# Patient Record
Sex: FEMALE
[De-identification: names, ages, dates, MRNs, and addresses within clinical notes are randomized per-mention and may not be internally consistent; named-entity substitution may affect disease eponyms.]

---
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
## 2023-09-11 ENCOUNTER — HOSPITAL ENCOUNTER (OUTPATIENT)
Dept: HOSPITAL 92 - CSHLD/OP | Age: 18
Discharge: HOME | End: 2023-09-11
Attending: OBSTETRICS & GYNECOLOGY
Payer: COMMERCIAL

## 2023-09-11 DIAGNOSIS — N89.8: ICD-10-CM

## 2023-09-11 DIAGNOSIS — O99.891: Primary | ICD-10-CM

## 2023-09-11 DIAGNOSIS — Z3A.37: ICD-10-CM

## 2023-09-11 DIAGNOSIS — Z88.4: ICD-10-CM

## 2023-09-11 PROCEDURE — 99282 EMERGENCY DEPT VISIT SF MDM: CPT

## 2023-09-14 ENCOUNTER — HOSPITAL ENCOUNTER (INPATIENT)
Dept: HOSPITAL 92 - CSHLD/OP | Age: 18
LOS: 2 days | Discharge: HOME | End: 2023-09-16
Attending: OBSTETRICS & GYNECOLOGY | Admitting: OBSTETRICS & GYNECOLOGY
Payer: COMMERCIAL

## 2023-09-14 VITALS — BODY MASS INDEX: 46.8 KG/M2

## 2023-09-14 DIAGNOSIS — Z3A.37: ICD-10-CM

## 2023-09-14 LAB
A1 MICROGLOB PLACENTAL VAG QL: (no result)
HBSAG INDEX: 0.27 S/CO (ref 0–0.99)
HCT VFR BLD CALC: 32.8 % (ref 34.9–44.5)
HGB BLD-MCNC: 10.3 G/DL (ref 12–15.5)
MCH RBC QN AUTO: 24.8 PG (ref 27–33)
MCV RBC AUTO: 79 FL (ref 81.6–98.3)
PLATELET # BLD AUTO: 305 10X3/UL (ref 150–450)
RBC # BLD AUTO: 4.15 10X6/UL (ref 3.9–5.03)
SYPHILIS ANTIBODY INDEX: 0.03 S/CO
WBC # BLD AUTO: 10.5 10X3/UL (ref 3.5–10.5)

## 2023-09-14 PROCEDURE — 86850 RBC ANTIBODY SCREEN: CPT

## 2023-09-14 PROCEDURE — 87340 HEPATITIS B SURFACE AG IA: CPT

## 2023-09-14 PROCEDURE — 85027 COMPLETE CBC AUTOMATED: CPT

## 2023-09-14 PROCEDURE — 0UQMXZZ REPAIR VULVA, EXTERNAL APPROACH: ICD-10-PCS | Performed by: OBSTETRICS & GYNECOLOGY

## 2023-09-14 PROCEDURE — 86780 TREPONEMA PALLIDUM: CPT

## 2023-09-14 PROCEDURE — S0020 INJECTION, BUPIVICAINE HYDRO: HCPCS

## 2023-09-14 PROCEDURE — 51702 INSERT TEMP BLADDER CATH: CPT

## 2023-09-14 PROCEDURE — 84112 EVAL AMNIOTIC FLUID PROTEIN: CPT

## 2023-09-14 PROCEDURE — 86901 BLOOD TYPING SEROLOGIC RH(D): CPT

## 2023-09-14 PROCEDURE — 86900 BLOOD TYPING SEROLOGIC ABO: CPT

## 2023-09-14 PROCEDURE — 0HQ9XZZ REPAIR PERINEUM SKIN, EXTERNAL APPROACH: ICD-10-PCS | Performed by: OBSTETRICS & GYNECOLOGY

## 2023-09-14 PROCEDURE — 99285 EMERGENCY DEPT VISIT HI MDM: CPT

## 2023-09-14 PROCEDURE — 10H07YZ INSERTION OF OTHER DEVICE INTO PRODUCTS OF CONCEPTION, VIA NATURAL OR ARTIFICIAL OPENING: ICD-10-PCS | Performed by: OBSTETRICS & GYNECOLOGY

## 2023-09-14 RX ADMIN — Medication SCH MLS: at 14:46

## 2023-09-14 RX ADMIN — Medication SCH MLS: at 18:10

## 2023-09-15 RX ADMIN — HYDROCODONE BITARTRATE AND ACETAMINOPHEN PRN TAB: 5; 325 TABLET ORAL at 15:27

## 2023-09-15 RX ADMIN — HYDROCODONE BITARTRATE AND ACETAMINOPHEN PRN TAB: 5; 325 TABLET ORAL at 04:20

## 2023-09-16 VITALS — SYSTOLIC BLOOD PRESSURE: 117 MMHG | DIASTOLIC BLOOD PRESSURE: 57 MMHG | TEMPERATURE: 97.8 F

## 2023-09-16 RX ADMIN — HYDROCODONE BITARTRATE AND ACETAMINOPHEN PRN TAB: 5; 325 TABLET ORAL at 13:30

## 2023-09-16 RX ADMIN — HYDROCODONE BITARTRATE AND ACETAMINOPHEN PRN TAB: 5; 325 TABLET ORAL at 08:01
